# Patient Record
Sex: FEMALE | Race: WHITE | Employment: UNEMPLOYED | URBAN - METROPOLITAN AREA
[De-identification: names, ages, dates, MRNs, and addresses within clinical notes are randomized per-mention and may not be internally consistent; named-entity substitution may affect disease eponyms.]

---

## 2019-03-25 ENCOUNTER — HOSPITAL ENCOUNTER (OUTPATIENT)
Dept: ULTRASOUND IMAGING | Age: 64
Discharge: HOME | End: 2019-03-25
Attending: ORTHOPAEDIC SURGERY
Payer: COMMERCIAL

## 2019-03-25 DIAGNOSIS — T14.90XA BLUNT TRAUMA: ICD-10-CM

## 2019-03-25 DIAGNOSIS — I82.409 DVT (DEEP VENOUS THROMBOSIS) (HCC): ICD-10-CM

## 2019-03-25 PROCEDURE — 93971 EXTREMITY STUDY: CPT

## 2023-08-29 ENCOUNTER — ANESTHESIA EVENT (OUTPATIENT)
Facility: HOSPITAL | Age: 68
End: 2023-08-29
Payer: MEDICARE

## 2023-08-29 ENCOUNTER — HOSPITAL ENCOUNTER (OUTPATIENT)
Facility: HOSPITAL | Age: 68
Setting detail: OUTPATIENT SURGERY
Discharge: HOME | End: 2023-08-29
Attending: INTERNAL MEDICINE | Admitting: INTERNAL MEDICINE
Payer: MEDICARE

## 2023-08-29 ENCOUNTER — ANESTHESIA (OUTPATIENT)
Facility: HOSPITAL | Age: 68
End: 2023-08-29
Payer: MEDICARE

## 2023-08-29 VITALS
RESPIRATION RATE: 13 BRPM | HEIGHT: 67 IN | BODY MASS INDEX: 45.99 KG/M2 | WEIGHT: 293 LBS | SYSTOLIC BLOOD PRESSURE: 140 MMHG | HEART RATE: 92 BPM | DIASTOLIC BLOOD PRESSURE: 79 MMHG | TEMPERATURE: 97.5 F | OXYGEN SATURATION: 95 %

## 2023-08-29 PROCEDURE — 7100000011 HC PHASE II RECOVERY - ADDTL 15 MIN: Performed by: INTERNAL MEDICINE

## 2023-08-29 PROCEDURE — 3700000001 HC ADD 15 MINUTES (ANESTHESIA): Performed by: INTERNAL MEDICINE

## 2023-08-29 PROCEDURE — 3700000000 HC ANESTHESIA ATTENDED CARE: Performed by: INTERNAL MEDICINE

## 2023-08-29 PROCEDURE — 7100000010 HC PHASE II RECOVERY - FIRST 15 MIN: Performed by: INTERNAL MEDICINE

## 2023-08-29 PROCEDURE — 2500000003 HC RX 250 WO HCPCS: Performed by: NURSE ANESTHETIST, CERTIFIED REGISTERED

## 2023-08-29 PROCEDURE — 6360000002 HC RX W HCPCS: Performed by: NURSE ANESTHETIST, CERTIFIED REGISTERED

## 2023-08-29 PROCEDURE — 3600007512: Performed by: INTERNAL MEDICINE

## 2023-08-29 PROCEDURE — 3600007502: Performed by: INTERNAL MEDICINE

## 2023-08-29 RX ORDER — DEXMEDETOMIDINE HYDROCHLORIDE 100 UG/ML
INJECTION, SOLUTION INTRAVENOUS PRN
Status: DISCONTINUED | OUTPATIENT
Start: 2023-08-29 | End: 2023-08-29 | Stop reason: SDUPTHER

## 2023-08-29 RX ORDER — METFORMIN HYDROCHLORIDE 500 MG/1
TABLET, EXTENDED RELEASE ORAL
COMMUNITY
Start: 2023-08-03

## 2023-08-29 RX ORDER — LIDOCAINE HYDROCHLORIDE 20 MG/ML
INJECTION, SOLUTION EPIDURAL; INFILTRATION; INTRACAUDAL; PERINEURAL PRN
Status: DISCONTINUED | OUTPATIENT
Start: 2023-08-29 | End: 2023-08-29 | Stop reason: SDUPTHER

## 2023-08-29 RX ORDER — OMEPRAZOLE 20 MG/1
CAPSULE, DELAYED RELEASE ORAL
COMMUNITY
Start: 2023-07-01

## 2023-08-29 RX ORDER — TRIAMTERENE AND HYDROCHLOROTHIAZIDE 75; 50 MG/1; MG/1
1 TABLET ORAL EVERY MORNING
COMMUNITY
Start: 2023-07-14

## 2023-08-29 RX ORDER — PROPOFOL 10 MG/ML
INJECTION, EMULSION INTRAVENOUS PRN
Status: DISCONTINUED | OUTPATIENT
Start: 2023-08-29 | End: 2023-08-29 | Stop reason: SDUPTHER

## 2023-08-29 RX ORDER — SODIUM CHLORIDE 0.9 % (FLUSH) 0.9 %
5-40 SYRINGE (ML) INJECTION EVERY 12 HOURS SCHEDULED
Status: DISCONTINUED | OUTPATIENT
Start: 2023-08-29 | End: 2023-08-29 | Stop reason: HOSPADM

## 2023-08-29 RX ORDER — ASPIRIN 81 MG/1
81 TABLET ORAL DAILY
COMMUNITY

## 2023-08-29 RX ORDER — SODIUM CHLORIDE 0.9 % (FLUSH) 0.9 %
5-40 SYRINGE (ML) INJECTION PRN
Status: DISCONTINUED | OUTPATIENT
Start: 2023-08-29 | End: 2023-08-29 | Stop reason: HOSPADM

## 2023-08-29 RX ORDER — CHLORAL HYDRATE 500 MG
CAPSULE ORAL
COMMUNITY

## 2023-08-29 RX ORDER — SODIUM CHLORIDE 9 MG/ML
25 INJECTION, SOLUTION INTRAVENOUS PRN
Status: DISCONTINUED | OUTPATIENT
Start: 2023-08-29 | End: 2023-08-29 | Stop reason: HOSPADM

## 2023-08-29 RX ORDER — PRAVASTATIN SODIUM 20 MG
20 TABLET ORAL DAILY
COMMUNITY

## 2023-08-29 RX ORDER — PROPOFOL 10 MG/ML
INJECTION, EMULSION INTRAVENOUS CONTINUOUS PRN
Status: DISCONTINUED | OUTPATIENT
Start: 2023-08-29 | End: 2023-08-29 | Stop reason: SDUPTHER

## 2023-08-29 RX ADMIN — DEXMEDETOMIDINE 4 MCG: 100 INJECTION, SOLUTION INTRAVENOUS at 10:40

## 2023-08-29 RX ADMIN — PROPOFOL 80 MCG/KG/MIN: 10 INJECTION, EMULSION INTRAVENOUS at 10:40

## 2023-08-29 RX ADMIN — LIDOCAINE HYDROCHLORIDE 60 MG: 20 INJECTION, SOLUTION EPIDURAL; INFILTRATION; INTRACAUDAL; PERINEURAL at 10:40

## 2023-08-29 RX ADMIN — PROPOFOL 80 MG: 10 INJECTION, EMULSION INTRAVENOUS at 10:40

## 2023-08-29 ASSESSMENT — PAIN - FUNCTIONAL ASSESSMENT: PAIN_FUNCTIONAL_ASSESSMENT: 0-10

## 2023-08-29 NOTE — ANESTHESIA POSTPROCEDURE EVALUATION
Department of Anesthesiology  Postprocedure Note    Patient: Rosalba Walker  MRN: [id number]  YOB: 1955  Date of evaluation: 8/29/2023      Procedure Summary     Date: 08/29/23 Room / Location: [facility] ENDO 03 / [facility] ENDOSCOPY    Anesthesia Start: 1034 Anesthesia Stop: 7539    Procedure: COLONOSCOPY (Lower GI Region) Diagnosis:       Personal history of colonic polyps      (Personal history of colonic polyps [Z86.010])    Surgeons: Patrick Perdomo MD Responsible Provider: Octavio Hardy MD    Anesthesia Type: TIVA, MAC ASA Status: 3          Anesthesia Type: No value filed.     Arthur Phase I: Arthur Score: 10    Arthur Phase II: Arthur Score: 10      Anesthesia Post Evaluation    Patient location during evaluation: bedside  Airway patency: patent  Nausea & Vomiting: no nausea and no vomiting  Complications: no  Cardiovascular status: hemodynamically stable  Respiratory status: acceptable  Comments: VITALS REVIEWED

## 2023-08-29 NOTE — DISCHARGE INSTRUCTIONS
2023    David Musa  :  1955  [facility] Medical Record Number:  [id number]      COLONOSCOPY FINDINGS:  Your colonoscopy showed: poor prep -- colonoscopy not completed. DISCOMFORT:  Redness at IV site- apply warm compress to area; if redness or soreness persist- contact your physician  There may be a slight amount of blood passed from the rectum  Gaseous discomfort- walking, belching will help relieve any discomfort  You may not operate a vehicle for 12 hours  You may not engage in an occupation involving machinery or appliances for rest of today  You may not drink alcoholic beverages for at least 12 hours  Avoid making any critical decisions for at least 24 hour  DIET:   regular   - however -  remember your colon is empty and a heavy meal will produce gas. Avoid these foods:  vegetables, fried / greasy foods, carbonated drinks for today     ACTIVITY:  You may resume your normal daily activities it is recommended that you spend the remainder of the day resting -  avoid any strenuous activity. CALL M.D. ANY SIGN OF:   Increasing pain, nausea, vomiting  Abdominal distension (swelling)  New increased bleeding (oral or rectal)  Fever (chills)  Pain in chest area  Bloody discharge from nose or mouth   Shortness of breath    Follow-up Instructions:   Call Dr. Jose Maria Knapp if any questions or problems.    Telephone # [phone]  Biopsy results will be available in  5 to 7 days  Should have a repeat colonoscopy in a few weeks

## 2023-08-29 NOTE — PROCEDURES
Igor Porter M.D.  [phone]            2023          Colonoscopy Operative Report  Colby Javier  :  1955  [facility] Medical Record Number:  [id number]      Indications:    Personal history of colon polyps (screening only)     :  Sue Noble MD    Assistant -- None  Implants -- None    Referring Provider: Dacia Schofield MD    Sedation:  MAC anesthesia Propofol    Pre-Procedural Exam:      Airway: clear,  No airway problems anticipated  Heart: RRR, without gallops or rubs  Lungs: clear bilaterally without wheezes, crackles, or rhonchi  Abdomen: soft, nontender, nondistended, bowel sounds present  Mental Status: awake, alert and oriented to person, place and time     Procedure Details:  After informed consent was obtained with all risks and benefits of procedure explained and preoperative exam completed, the patient was taken to the endoscopy suite and placed in the left lateral decubitus position. Upon sequential sedation as per above, a digital rectal exam was performed. The Olympus videocolonoscope  was inserted in the rectum and carefully advanced to the rectum. The quality of preparation was poor. The colonoscope was slowly withdrawn with careful inspection and evaluation between folds. Retroflexion in the rectum was performed. Findings:   Rectum: solid stool noted in the rectum    Interventions:  none    Specimen Removed:  none    Complications: None. EBL:  None. Impression:  poor colon prep    Recommendations:  -plan to repeat with a 2-day prep in a few weeks    Discharge Disposition:  Home in the company of a  when able to ambulate.     Sue Noble MD  2023  10:46 AM

## 2023-08-29 NOTE — PROGRESS NOTES
Procedure was incomplete due to patient not having adequate bowel preparation for Colonoscopy. Dr. Jose Maria Knapp spoke with family and patient made aware in Recovery by Pete Hurt.      Scope times:   Scope in @ 25-26-70-73 out @ [phone]

## 2023-08-29 NOTE — PROGRESS NOTES
[facility]  1955  [id number]    Situation:  Verbal report received from: ERIC HENRY SALVADOR RN   Procedure: Procedure(s):  COLONOSCOPY    Background:    Preoperative diagnosis: Personal history of colonic polyps [Z86.010]  Postoperative diagnosis: * No post-op diagnosis entered *    :  Dr. Fareed Campbell  Assistant(s): Circulator: Ramon Riley RN    Specimens: * No specimens in log *  H. Pylori  No    Assessment    Anesthesia gave intra-procedure sedation and medications, see anesthesia flow sheet No    Intravenous fluids: NS@ KVO     Vital signs stable     Abdominal assessment: round and soft     Recommendation:  Discharge patient per MD order.   Return to floor  Family or Friend   Permission to share finding with family or friend Yes

## 2023-08-29 NOTE — H&P
Father     Lung Disease Father     Heart Disease Mother              Review of Systems:      Constitutional: negative fever, negative chills, negative weight loss  Eyes:   negative visual changes  ENT:   negative sore throat, tongue or lip swelling  Respiratory:  negative cough, negative dyspnea  Cards:  negative for chest pain, palpitations, lower extremity edema  GI:   See HPI  :  negative for frequency, dysuria  Integument:  negative for rash and pruritus  Heme:  negative for easy bruising and gum/nose bleeding  Musculoskel: negative for myalgias,  back pain and muscle weakness  Neuro: negative for headaches, dizziness, vertigo  Psych:  negative for feelings of anxiety, depression       Objective:   No data found. No intake/output data recorded. No intake/output data recorded. EXAM:     NEURO-a&o   HEENT-wnl   LUNGS-clear    COR-regular rate and rhythym     ABD-soft , no tenderness, no rebound, good bs     EXT-no edema     Data Review     No results for input(s): WBC, HGB, HCT, PLT in the last 72 hours. No results for input(s): NA, K, CL, CO2, BUN, CREA, GLU, PHOS, CA in the last 72 hours. No results for input(s): TP, ALB, GLOB, GGT, AML in the last 72 hours. Invalid input(s): SGOT, GPT, AP, TBIL, AMYP, LPSE, HLPSE  No results for input(s): INR, APTT in the last 72 hours. Invalid input(s): PTP    There is no problem list on file for this patient. Assessment:     H/o polyps   Plan:     Colonoscopy today.      Signed By: Sarah Drew MD     8/29/2023  9:31 AM

## 2023-08-29 NOTE — PROGRESS NOTES
Endoscopy discharge instructions have been reviewed and given to patient. The patient verbalized understanding and acceptance of instructions. Dr. Sunny Ferraro discussed with patient procedure findings and next steps.

## 2023-08-29 NOTE — PROGRESS NOTES
Initial RN admission and assessment performed and documented in Endoscopy navigator. Patient evaluated by anesthesia in pre-procedure holding. All procedural vital signs, airway assessment, and level of consciousness information monitored and recorded by anesthesia staff on the anesthesia record. Report received from SAVANNA Bridges post procedure. Patient transported to recovery area by RN. Report given to post procedure RNNatalie was pre-cleaned at bedside immediately following procedure by Genevieve Bhatia.